# Patient Record
Sex: FEMALE | Race: BLACK OR AFRICAN AMERICAN | NOT HISPANIC OR LATINO | URBAN - METROPOLITAN AREA
[De-identification: names, ages, dates, MRNs, and addresses within clinical notes are randomized per-mention and may not be internally consistent; named-entity substitution may affect disease eponyms.]

---

## 2024-11-09 ENCOUNTER — EMERGENCY (EMERGENCY)
Facility: HOSPITAL | Age: 44
LOS: 1 days | Discharge: HOME | End: 2024-11-09
Admitting: STUDENT IN AN ORGANIZED HEALTH CARE EDUCATION/TRAINING PROGRAM
Payer: MEDICAID

## 2024-11-09 PROCEDURE — 99284 EMERGENCY DEPT VISIT MOD MDM: CPT

## 2024-11-10 VITALS
WEIGHT: 192.9 LBS | RESPIRATION RATE: 18 BRPM | DIASTOLIC BLOOD PRESSURE: 88 MMHG | TEMPERATURE: 98 F | SYSTOLIC BLOOD PRESSURE: 128 MMHG | OXYGEN SATURATION: 98 % | HEART RATE: 81 BPM

## 2024-11-10 PROCEDURE — 73610 X-RAY EXAM OF ANKLE: CPT

## 2024-11-10 PROCEDURE — 99284 EMERGENCY DEPT VISIT MOD MDM: CPT | Mod: 25

## 2024-11-10 PROCEDURE — 73502 X-RAY EXAM HIP UNI 2-3 VIEWS: CPT

## 2024-11-10 PROCEDURE — 73502 X-RAY EXAM HIP UNI 2-3 VIEWS: CPT | Mod: 26,LT

## 2024-11-10 PROCEDURE — 73610 X-RAY EXAM OF ANKLE: CPT | Mod: 26,LT

## 2024-11-10 PROCEDURE — 73630 X-RAY EXAM OF FOOT: CPT | Mod: 26,LT

## 2024-11-10 PROCEDURE — 73630 X-RAY EXAM OF FOOT: CPT

## 2024-11-10 RX ORDER — ACETAMINOPHEN 500 MG
1000 TABLET ORAL ONCE
Refills: 0 | Status: COMPLETED | OUTPATIENT
Start: 2024-11-10 | End: 2024-11-10

## 2024-11-10 RX ORDER — IBUPROFEN 200 MG
400 TABLET ORAL ONCE
Refills: 0 | Status: COMPLETED | OUTPATIENT
Start: 2024-11-10 | End: 2024-11-10

## 2024-11-10 RX ADMIN — Medication 1000 MILLIGRAM(S): at 01:00

## 2024-11-10 RX ADMIN — Medication 400 MILLIGRAM(S): at 01:00

## 2024-11-10 NOTE — ED PROVIDER NOTE - PHYSICAL EXAMINATION
CONSTITUTIONAL: NAD   SKIN: Normal color and turgor.    HEAD: NC/AT.  EYES: Conjunctiva clear. Anicteric sclera.  ENT: Airway clear. Normal voice. MMM.  RESPIRATORY:  Normal respiratory rate and effort.  CARDIOVASCULAR:  RRR   GI:  Abdomen soft, nontender.    MSK: No midline cervical tenderness. Mild tenderness to palpation over left hip joint. No shortening or rotation of LE; ROM is normal.  Thigh compartments soft, nontender.  Knee: no swelling, no tenderness, ROM is normal.  Lower leg: no calf tenderness, no tenderness over proximal fibula, compartments soft.  Ankle: mild tenderness over tip of lateral malleolus.  Mild swelling lateral ankle. No tenderness over medial malleolus.  Achilles intact, no stepoff, Barba neg.  Foot mild tenderness over base of fifth MT, no swelling. Strong DP and PT pulses, foot color and temp normal.  Cap refill < 2 sec.   NEURO: Alert, clear mental status.  Speech clear. No focal deficits.
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
## 2024-11-10 NOTE — ED ADULT NURSE NOTE - AVIAN FLU SYMPTOMS
Telephone Encounter by Mahi Lemon, JEFF, BSN at 02/22/18 09:50 AM     Author:  Mahi Lemon RN, BSN Service:  (none) Author Type:  Registered Nurse     Filed:  02/22/18 09:59 AM Encounter Date:  2/22/2018 Status:  Signed     :  Mahi Lemon RN, BSN (Registered Nurse)            Received refill request via fax from[1.1T] [facility][1.1M] for[JH1.1T] Simvastatin[JH1.1M]. Medication refilled per Cardiology protocol.[JH1.1T]          Revision History        User Key Date/Time User Provider Type Action    > 1.1 02/22/18 09:59 AM Mahi Lemon, RN, BSN Registered Nurse Sign    M - Manual, T - Template             No
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
## 2024-11-10 NOTE — ED ADULT NURSE NOTE - OBJECTIVE STATEMENT
reports left ankle pain after twisted ankle on a uneven pavement, pt also reports left hip pain after making sudden turn trying not to fall. +pedal pulses. no swelling noted at present time. no bruising,  ice pack applied. no meds taken pta

## 2024-11-10 NOTE — ED ADULT NURSE NOTE - NSFALLUNIVINTERV_ED_ALL_ED
Bed/Stretcher in lowest position, wheels locked, appropriate side rails in place/Call bell, personal items and telephone in reach/Instruct patient to call for assistance before getting out of bed/chair/stretcher/Non-slip footwear applied when patient is off stretcher/[place] to call system/Physically safe environment - no spills, clutter or unnecessary equipment/Purposeful proactive rounding/Room/bathroom lighting operational, light cord in reach

## 2024-11-10 NOTE — ED PROVIDER NOTE - PATIENT PORTAL LINK FT
You can access the FollowMyHealth Patient Portal offered by [facility] by registering at the following website: [email or web address]. By joining [facility] [employer]’s FollowMyHealth portal, you will also be able to view your health information using other applications (apps) compatible with our system.

## 2024-11-10 NOTE — ED PROVIDER NOTE - NSFOLLOWUPINSTRUCTIONS_ED_ALL_ED_FT
Advil/Tylenol if needed for pain, use as directed.  Use the walking Cam Boot.  Please follow up at orthopedic clinic next week.  Call for appointment: [phone]  ===================  Ankle Sprain  Illustration of an ankle sprain caused by a foot turning outward and a foot turning inward.  An ankle sprain is a stretch or tear in a ligament in the ankle. Ligaments are tissues that connect bones to each other.    The two most common types of ankle sprains are:  Inversion sprain. This happens when the foot turns inward and the ankle rolls outward. It affects the ligament on the outside of the foot (lateral ligament).  Eversion sprain. This happens when the foot turns outward and the ankle rolls inward. It affects the ligament on the inner side of the foot (medial ligament).  What are the causes?  An ankle sprain is often caused by rolling or twisting the ankle by accident.    What increases the risk?  You are more likely to get an ankle sprain if you play sports.    What are the signs or symptoms?  Comparison of a normal ankle and an ankle that is swollen and bruised.  Symptoms of an ankle sprain include:  Pain in your ankle.  Swelling.  Bruising. Bruises may form right after you sprain your ankle or 1–2 days later.  Trouble standing or walking. This includes trouble turning or changing directions.  How is this diagnosed?  An ankle sprain is diagnosed with a physical exam. Your health care provider will press on parts of your foot and ankle and try to move them in certain ways.    You may also have X-rays taken. These may be done to see how severe the sprain is and to check for broken bones.    How is this treated?  An ankle sprain may be treated with:  A brace or splint. This is used to keep the ankle from moving until it heals.  An elastic bandage (dressing). This is used to support the ankle.  Crutches.  Pain medicine.  Surgery. This may be needed if the sprain is severe.  Physical therapy. This may help to improve the range of motion in the ankle.  Follow these instructions at home:  If you have a removable brace or a splint:    Wear the brace or splint as told by your provider. Remove it only as told by your provider.  Check the skin around the brace or splint every day. Tell your provider about any concerns.  Loosen the brace or splint if your toes tingle, become numb, or turn cold and blue.  Keep the brace or splint clean.  If the brace or splint is not waterproof:  Do not let it get wet.  Cover it with a watertight covering when you take a bath or a shower.  If you have an elastic dressing:    Take the dressing off to shower or bathe.  If the dressing feels too tight, adjust it to make it more comfortable.  Loosen the dressing if your foot tingles, becomes numb, or turns cold and blue.  Managing pain, stiffness, and swelling    If told, put ice on the affected area.  If you have a removable brace or splint, remove it as told by your provider.  Put ice in a plastic bag.  Place a towel between your skin and the bag.  Leave the ice on for 20 minutes, 2–3 times a day.  Remove the ice if your skin turns bright red. This is very important. If you cannot feel pain, heat, or cold, you have a greater risk of damage to the area.  If your skin turns bright red, remove the ice right away to prevent skin damage. The risk of damage is higher if you cannot feel pain, heat, or cold.  Move your toes often to reduce stiffness and swelling.  For 2–3 days, raise (elevate) your ankle above the level of your heart while you are sitting or lying down.  General instructions    Take over-the-counter and prescription medicines only as told by your provider.  Do not use any products that contain nicotine or tobacco. These products include cigarettes, chewing tobacco, and vaping devices, such as e-cigarettes. If you need help quitting, ask your provider.  Rest your ankle.  Do not use your ankle to support your body weight until your provider says that you can. Use crutches as told by your provider.  Ask your provider when it is safe to drive if you have a brace or splint on your ankle.  Contact a health care provider if:  You have bruising or swelling that get worse all of a sudden.  Your pain does not get better with medicine.  Get help right away if:  Your foot or toes become numb or blue.  You have severe pain that gets worse.  This information is not intended to replace advice given to you by your health care provider. Make sure you discuss any questions you have with your health care provider.

## 2024-11-12 DIAGNOSIS — M25.572 PAIN IN LEFT ANKLE AND JOINTS OF LEFT FOOT: ICD-10-CM
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
## 2024-11-12 DIAGNOSIS — S93.402A SPRAIN OF UNSPECIFIED LIGAMENT OF LEFT ANKLE, INITIAL ENCOUNTER: ICD-10-CM
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
